# Patient Record
Sex: FEMALE | Race: WHITE | URBAN - METROPOLITAN AREA
[De-identification: names, ages, dates, MRNs, and addresses within clinical notes are randomized per-mention and may not be internally consistent; named-entity substitution may affect disease eponyms.]

---

## 2019-06-27 ENCOUNTER — OFFICE VISIT (OUTPATIENT)
Dept: URGENT CARE | Facility: URGENT CARE | Age: 24
End: 2019-06-27
Payer: COMMERCIAL

## 2019-06-27 VITALS
OXYGEN SATURATION: 98 % | HEART RATE: 81 BPM | WEIGHT: 148.9 LBS | TEMPERATURE: 99.1 F | SYSTOLIC BLOOD PRESSURE: 102 MMHG | DIASTOLIC BLOOD PRESSURE: 76 MMHG | RESPIRATION RATE: 18 BRPM

## 2019-06-27 DIAGNOSIS — R05.8 PRODUCTIVE COUGH: ICD-10-CM

## 2019-06-27 DIAGNOSIS — J01.00 ACUTE MAXILLARY SINUSITIS, RECURRENCE NOT SPECIFIED: ICD-10-CM

## 2019-06-27 DIAGNOSIS — H69.93 DYSFUNCTION OF BOTH EUSTACHIAN TUBES: ICD-10-CM

## 2019-06-27 DIAGNOSIS — R07.89 CHEST TIGHTNESS: ICD-10-CM

## 2019-06-27 DIAGNOSIS — R09.89 CHEST CONGESTION: Primary | ICD-10-CM

## 2019-06-27 PROCEDURE — 99204 OFFICE O/P NEW MOD 45 MIN: CPT | Performed by: PHYSICIAN ASSISTANT

## 2019-06-27 RX ORDER — AZITHROMYCIN 250 MG/1
TABLET, FILM COATED ORAL
Qty: 6 TABLET | Refills: 0 | Status: SHIPPED | OUTPATIENT
Start: 2019-06-27

## 2019-06-27 RX ORDER — FLUTICASONE PROPIONATE 50 MCG
1-2 SPRAY, SUSPENSION (ML) NASAL DAILY
Qty: 16 G | Refills: 0 | Status: SHIPPED | OUTPATIENT
Start: 2019-06-27

## 2019-06-27 RX ORDER — ALBUTEROL SULFATE 90 UG/1
2 AEROSOL, METERED RESPIRATORY (INHALATION) EVERY 6 HOURS
Qty: 8.5 G | Refills: 0 | Status: SHIPPED | OUTPATIENT
Start: 2019-06-27

## 2019-06-27 SDOH — HEALTH STABILITY: MENTAL HEALTH: HOW OFTEN DO YOU HAVE A DRINK CONTAINING ALCOHOL?: MONTHLY OR LESS

## 2019-06-27 NOTE — PROGRESS NOTES
SUBJECTIVE:   Kailee Nugent is a 23 year old female presenting with a chief complaint of chest congestion, productive cough and wheezing.  Onset of symptoms was 1 week(s) ago.  Course of illness is worsening.    Severity moderate  Current and Associated symptoms: chest congestion, productive cough and sinus congestion, sinus pain  Treatment measures tried include OTC medications.  Predisposing factors include recent illness.    PMH  Overweight     Allergies   Allergen Reactions     Other  [No Clinical Screening - See Comments]      Nickel and silver     Family Hx  HTN  Overweight    Social History     Tobacco Use     Smoking status: Never Smoker     Smokeless tobacco: Never Used   Substance Use Topics     Alcohol use: Yes     Frequency: Monthly or less       ROS:  CONSTITUTIONAL:NEGATIVE for fever, chills, change in weight  INTEGUMENTARY/SKIN: NEGATIVE for worrisome rashes, moles or lesions  EYES: NEGATIVE for vision changes or irritation  ENT/MOUTH: POSITIVE for ear pressure, sinus congestion, sinus pain  RESP:POSTIIVE for chest congestion, productive cough  CV: NEGATIVE for chest pain, palpitations or peripheral edema  GI: NEGATIVE for nausea, abdominal pain, heartburn, or change in bowel habits  : negative for and dysuria  MUSCULOSKELETAL: NEGATIVE for significant arthralgias or myalgia  NEURO: NEGATIVE for weakness, dizziness or paresthesias    OBJECTIVE  :/76   Pulse 81   Temp 99.1  F (37.3  C) (Tympanic)   Resp 18   Wt 67.5 kg (148 lb 14.4 oz)   SpO2 98%   GENERAL APPEARANCE: healthy, alert and no distress  EYES: EOMI,  PERRL, conjunctiva clear  HENT: TM's normal bilaterally, nasal turbinates erythematous, swollen, frontal sinus tenderness  and maxillary sinus tenderness   NECK: supple, nontender, no lymphadenopathy  RESP: lungs clear to auscultation - no rales, rhonchi or wheezes  CV: regular rates and rhythm, normal S1 S2, no murmur noted  ABDOMEN:  soft, nontender, no HSM or masses and bowel  sounds normal  NEURO: Normal strength and tone, sensory exam grossly normal,  normal speech and mentation  SKIN: no suspicious lesions or rashes    ASSESSMENT/PLAN      ICD-10-CM    1. Chest congestion R09.89 azithromycin (ZITHROMAX) 250 MG tablet   2. Productive cough R05 azithromycin (ZITHROMAX) 250 MG tablet   3. Chest tightness R07.89 albuterol (PROVENTIL HFA) 108 (90 Base) MCG/ACT inhaler   4. Dysfunction of both eustachian tubes H69.83 fluticasone (FLONASE) 50 MCG/ACT nasal spray   5. Acute maxillary sinusitis, recurrence not specified J01.00 azithromycin (ZITHROMAX) 250 MG tablet       Orders Placed This Encounter     azithromycin (ZITHROMAX) 250 MG tablet     albuterol (PROVENTIL HFA) 108 (90 Base) MCG/ACT inhaler     fluticasone (FLONASE) 50 MCG/ACT nasal spray       Fluids, rest  flonase for ear pressure and sinus congestion  zpak for productive cough, chest congestion  Follow up with PCP as needed  See orders in Epic

## 2019-07-15 ENCOUNTER — OFFICE VISIT (OUTPATIENT)
Dept: URGENT CARE | Facility: URGENT CARE | Age: 24
End: 2019-07-15
Payer: COMMERCIAL

## 2019-07-15 VITALS
SYSTOLIC BLOOD PRESSURE: 118 MMHG | DIASTOLIC BLOOD PRESSURE: 78 MMHG | HEIGHT: 62 IN | OXYGEN SATURATION: 98 % | WEIGHT: 148 LBS | HEART RATE: 86 BPM | TEMPERATURE: 98.7 F | RESPIRATION RATE: 16 BRPM | BODY MASS INDEX: 27.23 KG/M2

## 2019-07-15 DIAGNOSIS — H69.91 DYSFUNCTION OF RIGHT EUSTACHIAN TUBE: Primary | ICD-10-CM

## 2019-07-15 PROCEDURE — 99213 OFFICE O/P EST LOW 20 MIN: CPT | Performed by: FAMILY MEDICINE

## 2019-07-15 RX ORDER — FLUTICASONE PROPIONATE 50 MCG
2 SPRAY, SUSPENSION (ML) NASAL DAILY
Qty: 15.8 ML | Refills: 0 | Status: SHIPPED | OUTPATIENT
Start: 2019-07-15 | End: 2019-08-14

## 2019-07-15 RX ORDER — CETIRIZINE HYDROCHLORIDE 10 MG/1
10 TABLET ORAL DAILY
Qty: 10 TABLET | Refills: 0 | Status: SHIPPED | OUTPATIENT
Start: 2019-07-15 | End: 2019-07-25

## 2019-07-15 ASSESSMENT — MIFFLIN-ST. JEOR: SCORE: 1374.57

## 2019-07-15 NOTE — PROGRESS NOTES
"SUBJECTIVE:Kailee Nugent is a 23 year old female who presents with right ear pain and fullnessfor day(s).   Severity: mild and moderate   Timing:still present  Additional symptoms include congestion.    History of recurrent otitis: no    No past medical history on file.  Allergies   Allergen Reactions     Other  [No Clinical Screening - See Comments]      Nickel and silver     Social History     Tobacco Use     Smoking status: Never Smoker     Smokeless tobacco: Never Used   Substance Use Topics     Alcohol use: Yes     Frequency: Monthly or less       ROS: CONSTITUTIONAL:NEGATIVE for fever, chills, change in weight    OBJECTIVE:  /78   Pulse 86   Temp 98.7  F (37.1  C) (Oral)   Resp 16   Ht 1.575 m (5' 2\")   Wt 67.1 kg (148 lb)   SpO2 98%   BMI 27.07 kg/m     The right TM is normal: no effusions, no erythema, and normal landmarks     The right auditory canal is normal and without drainage, edema or erythema  The left TM is normal: no effusions, no erythema, and normal landmarks  The left auditory canal is normal and without drainage, edema or erythema  Oropharynx exam is normal: no lesions, erythema, adenopathy or exudate.GENERAL: no acute distress  EYES: EOMI,  PERRL, conjunctiva clear  SKIN: no suspicious lesions or rashes       ICD-10-CM    1. Dysfunction of right eustachian tube H69.81 fluticasone (FLONASE) 50 MCG/ACT nasal spray     cetirizine (ZYRTEC) 10 MG tablet       F/U PCP/IM/FP/UC if worse, not any better    "